# Patient Record
Sex: FEMALE | Race: WHITE | Employment: OTHER | URBAN - METROPOLITAN AREA
[De-identification: names, ages, dates, MRNs, and addresses within clinical notes are randomized per-mention and may not be internally consistent; named-entity substitution may affect disease eponyms.]

---

## 2018-06-29 ENCOUNTER — TELEPHONE (OUTPATIENT)
Dept: FAMILY MEDICINE CLINIC | Age: 83
End: 2018-06-29

## 2018-06-29 RX ORDER — SIMVASTATIN 20 MG
20 TABLET ORAL NIGHTLY
Qty: 30 TABLET | Refills: 5 | Status: ON HOLD | OUTPATIENT
Start: 2018-06-29 | End: 2018-07-23 | Stop reason: HOSPADM

## 2018-06-29 RX ORDER — LOSARTAN POTASSIUM 50 MG/1
50 TABLET ORAL DAILY
Qty: 30 TABLET | Refills: 5 | Status: SHIPPED | OUTPATIENT
Start: 2018-06-29

## 2018-07-16 ENCOUNTER — OFFICE VISIT (OUTPATIENT)
Dept: FAMILY MEDICINE CLINIC | Age: 83
End: 2018-07-16
Payer: MEDICAID

## 2018-07-16 ENCOUNTER — HOSPITAL ENCOUNTER (OUTPATIENT)
Age: 83
Discharge: HOME | DRG: 342 | End: 2018-07-18
Payer: MEDICAID

## 2018-07-16 VITALS
WEIGHT: 110 LBS | DIASTOLIC BLOOD PRESSURE: 62 MMHG | OXYGEN SATURATION: 96 % | BODY MASS INDEX: 21.6 KG/M2 | TEMPERATURE: 97.9 F | HEART RATE: 80 BPM | RESPIRATION RATE: 18 BRPM | SYSTOLIC BLOOD PRESSURE: 120 MMHG | HEIGHT: 60 IN

## 2018-07-16 DIAGNOSIS — I82.890 BLOOD CLOT OF NECK VEIN: ICD-10-CM

## 2018-07-16 DIAGNOSIS — C73 THYROID CANCER (HCC): ICD-10-CM

## 2018-07-16 DIAGNOSIS — E55.9 VITAMIN D DEFICIENCY: ICD-10-CM

## 2018-07-16 DIAGNOSIS — D72.819 LEUKOPENIA, UNSPECIFIED TYPE: ICD-10-CM

## 2018-07-16 DIAGNOSIS — I82.C11 THROMBOSIS OF RIGHT INTERNAL JUGULAR VEIN (HCC): ICD-10-CM

## 2018-07-16 DIAGNOSIS — M79.601 ARM PAIN, ANTERIOR, RIGHT: ICD-10-CM

## 2018-07-16 DIAGNOSIS — C73 THYROID CANCER (HCC): Primary | ICD-10-CM

## 2018-07-16 DIAGNOSIS — I10 ESSENTIAL HYPERTENSION, BENIGN: ICD-10-CM

## 2018-07-16 PROCEDURE — 85025 COMPLETE CBC W/AUTO DIFF WBC: CPT

## 2018-07-16 PROCEDURE — 82306 VITAMIN D 25 HYDROXY: CPT

## 2018-07-16 PROCEDURE — 36415 COLL VENOUS BLD VENIPUNCTURE: CPT | Performed by: FAMILY MEDICINE

## 2018-07-16 PROCEDURE — 99214 OFFICE O/P EST MOD 30 MIN: CPT | Performed by: FAMILY MEDICINE

## 2018-07-16 PROCEDURE — 80053 COMPREHEN METABOLIC PANEL: CPT

## 2018-07-16 PROCEDURE — G8400 PT W/DXA NO RESULTS DOC: HCPCS | Performed by: FAMILY MEDICINE

## 2018-07-16 PROCEDURE — G8427 DOCREV CUR MEDS BY ELIG CLIN: HCPCS | Performed by: FAMILY MEDICINE

## 2018-07-16 PROCEDURE — 4040F PNEUMOC VAC/ADMIN/RCVD: CPT | Performed by: FAMILY MEDICINE

## 2018-07-16 PROCEDURE — 1123F ACP DISCUSS/DSCN MKR DOCD: CPT | Performed by: FAMILY MEDICINE

## 2018-07-16 PROCEDURE — 84443 ASSAY THYROID STIM HORMONE: CPT

## 2018-07-16 PROCEDURE — 1090F PRES/ABSN URINE INCON ASSESS: CPT | Performed by: FAMILY MEDICINE

## 2018-07-16 PROCEDURE — 1036F TOBACCO NON-USER: CPT | Performed by: FAMILY MEDICINE

## 2018-07-16 PROCEDURE — G8420 CALC BMI NORM PARAMETERS: HCPCS | Performed by: FAMILY MEDICINE

## 2018-07-16 PROCEDURE — 1101F PT FALLS ASSESS-DOCD LE1/YR: CPT | Performed by: FAMILY MEDICINE

## 2018-07-16 RX ORDER — OXYCODONE HYDROCHLORIDE AND ACETAMINOPHEN 5; 325 MG/1; MG/1
1 TABLET ORAL 2 TIMES DAILY PRN
Qty: 60 TABLET | Refills: 0 | Status: ON HOLD | OUTPATIENT
Start: 2018-07-16 | End: 2018-07-23

## 2018-07-16 RX ORDER — LEVOTHYROXINE SODIUM 0.05 MG/1
50 TABLET ORAL
Status: ON HOLD | COMMUNITY
Start: 2017-10-26 | End: 2018-07-19

## 2018-07-16 ASSESSMENT — ENCOUNTER SYMPTOMS
VOMITING: 0
COUGH: 0
FACIAL SWELLING: 0
BACK PAIN: 0
SINUS PRESSURE: 0
SORE THROAT: 0
ALLERGIC/IMMUNOLOGIC NEGATIVE: 1
PHOTOPHOBIA: 0
APNEA: 0
SHORTNESS OF BREATH: 0
COLOR CHANGE: 0
WHEEZING: 0
DIARRHEA: 0
ABDOMINAL PAIN: 0
CHEST TIGHTNESS: 0
BLOOD IN STOOL: 0
NAUSEA: 0

## 2018-07-16 ASSESSMENT — PATIENT HEALTH QUESTIONNAIRE - PHQ9
1. LITTLE INTEREST OR PLEASURE IN DOING THINGS: 0
SUM OF ALL RESPONSES TO PHQ9 QUESTIONS 1 & 2: 0
SUM OF ALL RESPONSES TO PHQ QUESTIONS 1-9: 0
2. FEELING DOWN, DEPRESSED OR HOPELESS: 0

## 2018-07-16 NOTE — PROGRESS NOTES
Chief Complaint:   Chief Complaint   Patient presents with    Otalgia    Fatigue    Arm Pain     rigth upper arm       Fatigue   This is a recurrent problem. The current episode started more than 1 year ago. Associated symptoms include arthralgias and fatigue. Pertinent negatives include no abdominal pain, chest pain, congestion, coughing, headaches, joint swelling, myalgias, nausea, rash, sore throat, vomiting or weakness. Arm Pain    The incident occurred more than 1 week ago. There was no injury mechanism. The pain is present in the upper right arm. The quality of the pain is described as aching. The pain is at a severity of 7/10. The pain is moderate. The pain has been fluctuating since the incident. Pertinent negatives include no chest pain.        Patient Active Problem List   Diagnosis    Rectal bleeding    Familial combined hyperlipidemia    Essential hypertension, benign    Blood clot of neck vein    Thyroid cancer ([address]    Internal jugular vein thrombosis (HCC)    Pericardial cyst    Vitamin D deficiency       Past Medical History:   Diagnosis Date    Cancer ([address]     thyroid    Diabetes mellitus type 2, diet-controlled ([address]     GERD (gastroesophageal reflux disease)     Hyperlipidemia     Hypertension     Rectal bleed     Thyroid cancer ([address] 2016    Wears dentures     upper       Past Surgical History:   Procedure Laterality Date    APPENDECTOMY      BLADDER SUSPENSION  2008     SECTION      CHOLECYSTECTOMY      CHOLECYSTECTOMY  2003    COLONOSCOPY  11/10/2014    So - sigmoid diverticulosis     CO SONO GUIDE NEEDLE BIOPSY Right     Thyroid    UPPER GASTROINTESTINAL ENDOSCOPY  11/10/2014    So - Hiatal hernia        Current Outpatient Prescriptions   Medication Sig Dispense Refill    levothyroxine (SYNTHROID) 50 MCG tablet Take 50 mcg by mouth      oxyCODONE-acetaminophen (PERCOCET) 5-325 MG per tablet Take 1 tablet by mouth 2 times daily as needed for Pain for up to 30 days. . 60 tablet 0    apixaban (ELIQUIS) 5 MG TABS tablet Take 1 tablet by mouth 2 times daily 60 tablet 5    simvastatin (ZOCOR) 20 MG tablet Take 1 tablet by mouth nightly 30 tablet 5    losartan (COZAAR) 50 MG tablet Take 1 tablet by mouth daily 30 tablet 5    omeprazole (PRILOSEC) 20 MG delayed release capsule Take 1 capsule by mouth daily 30 capsule 5     No current facility-administered medications for this visit. Allergies   Allergen Reactions    Pcn [Penicillins] Rash       Social History     Social History    Marital status:      Spouse name: N/A    Number of children: N/A    Years of education: N/A     Occupational History     None     Social History Main Topics    Smoking status: Never Smoker    Smokeless tobacco: Never Used    Alcohol use No      Comment: coffee 2 cups daily    Drug use: No    Sexual activity: Not Asked     Other Topics Concern    None     Social History Narrative    None       Family History   Problem Relation Age of Onset    Cancer Mother         vaginal    Cancer Father         prostate         Review of Systems   Constitutional: Positive for fatigue. HENT: Negative for congestion, facial swelling, hearing loss, nosebleeds, sinus pressure and sore throat. Eyes: Negative for photophobia and visual disturbance. Respiratory: Negative for apnea, cough, chest tightness, shortness of breath and wheezing. Cardiovascular: Negative for chest pain, palpitations and leg swelling. Gastrointestinal: Negative for abdominal pain, blood in stool, diarrhea, nausea and vomiting. Genitourinary: Negative for difficulty urinating, frequency and urgency. Musculoskeletal: Positive for arthralgias. Negative for back pain, joint swelling and myalgias. Skin: Negative for color change and rash. Allergic/Immunologic: Negative. Neurological: Negative for syncope, weakness, light-headedness and headaches. Hematological: Negative. Psychiatric/Behavioral: Negative for agitation, behavioral problems, confusion and self-injury. The patient is not nervous/anxious. All other systems reviewed and are negative. Physical Exam   Constitutional: She is oriented to person, place, and time. She appears well-developed and well-nourished. No distress. HENT:   Head: Normocephalic and atraumatic. Nose: Nose normal.   Mouth/Throat: Oropharynx is clear and moist.   Eyes: Conjunctivae and EOM are normal. Pupils are equal, round, and reactive to light. Neck: Normal range of motion. Neck supple. No JVD present. No thyromegaly present. Cardiovascular: Normal rate, regular rhythm and normal heart sounds. Exam reveals no gallop and no friction rub. No murmur heard. Pulmonary/Chest: Effort normal and breath sounds normal. No respiratory distress. She has no wheezes. Abdominal: Soft. Bowel sounds are normal. She exhibits no distension. There is no tenderness. There is no rebound and no guarding. Musculoskeletal: Normal range of motion. Right upper arm: She exhibits tenderness, bony tenderness and swelling. Lymphadenopathy:     She has no cervical adenopathy. Neurological: She is alert and oriented to person, place, and time. She has normal reflexes. No cranial nerve deficit. She exhibits normal muscle tone. Coordination normal.   Skin: Skin is warm and dry. No rash noted. No erythema. Psychiatric: She has a normal mood and affect. Her behavior is normal. Judgment normal.   Nursing note and vitals reviewed. ASSESSMENT/PLAN:    Maura Chandler was seen today for otalgia, fatigue and arm pain. Diagnoses and all orders for this visit:    Th[facility] cancer ([facility]  -     CBC Auto Differential; Future  -     Comprehensive Metabolic Panel; Future  -     TSH without Reflex; Future  -     oxyCODONE-acetaminophen (PERCOCET) 5-325 MG per tablet;  Take 1 tablet by mouth 2 times daily as needed for Pain for up to 30 days..    Blood clot of neck vein  -     TSH without Reflex; Future  -     oxyCODONE-acetaminophen (PERCOCET) 5-325 MG per tablet; Take 1 tablet by mouth 2 times daily as needed for Pain for up to 30 days. .    Essential hypertension, benign    Arm pain, anterior, right  -     oxyCODONE-acetaminophen (PERCOCET) 5-325 MG per tablet; Take 1 tablet by mouth 2 times daily as needed for Pain for up to 30 days. .    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

## 2018-07-17 DIAGNOSIS — D70.9 NEUTROPENIA, UNSPECIFIED TYPE (HCC): Primary | ICD-10-CM

## 2018-07-17 LAB
ALBUMIN SERPL-MCNC: 4.2 G/DL (ref 3.5–5.2)
ALP BLD-CCNC: 67 U/L (ref 35–104)
ALT SERPL-CCNC: 7 U/L (ref 0–32)
ANION GAP SERPL CALCULATED.3IONS-SCNC: 11 MMOL/L (ref 7–16)
AST SERPL-CCNC: 22 U/L (ref 0–31)
BASOPHILS ABSOLUTE: 0 E9/L (ref 0–0.2)
BASOPHILS RELATIVE PERCENT: 0 % (ref 0–2)
BILIRUB SERPL-MCNC: 0.5 MG/DL (ref 0–1.2)
BUN BLDV-MCNC: 11 MG/DL (ref 8–23)
CALCIUM SERPL-MCNC: 10 MG/DL (ref 8.6–10.2)
CHLORIDE BLD-SCNC: 102 MMOL/L (ref 98–107)
CO2: 30 MMOL/L (ref 22–29)
CREAT SERPL-MCNC: 0.5 MG/DL (ref 0.5–1)
EOSINOPHILS ABSOLUTE: 0.08 E9/L (ref 0.05–0.5)
EOSINOPHILS RELATIVE PERCENT: 3.7 % (ref 0–6)
GFR AFRICAN AMERICAN: >60
GFR NON-AFRICAN AMERICAN: >60 ML/MIN/1.73
GLUCOSE BLD-MCNC: 152 MG/DL (ref 74–109)
HCT VFR BLD CALC: 41.1 % (ref 34–48)
HEMOGLOBIN: 13.1 G/DL (ref 11.5–15.5)
IMMATURE GRANULOCYTES #: 0.01 E9/L
IMMATURE GRANULOCYTES %: 0.5 % (ref 0–5)
LYMPHOCYTES ABSOLUTE: 0.65 E9/L (ref 1.5–4)
LYMPHOCYTES RELATIVE PERCENT: 29.7 % (ref 20–42)
MCH RBC QN AUTO: 30.1 PG (ref 26–35)
MCHC RBC AUTO-ENTMCNC: 31.9 % (ref 32–34.5)
MCV RBC AUTO: 94.5 FL (ref 80–99.9)
MONOCYTES ABSOLUTE: 0.4 E9/L (ref 0.1–0.95)
MONOCYTES RELATIVE PERCENT: 18.3 % (ref 2–12)
NEUTROPHILS ABSOLUTE: 1.05 E9/L (ref 1.8–7.3)
NEUTROPHILS RELATIVE PERCENT: 47.8 % (ref 43–80)
PDW BLD-RTO: 12.3 FL (ref 11.5–15)
PLATELET # BLD: 190 E9/L (ref 130–450)
PMV BLD AUTO: 10.3 FL (ref 7–12)
POTASSIUM SERPL-SCNC: 5.2 MMOL/L (ref 3.5–5)
RBC # BLD: 4.35 E12/L (ref 3.5–5.5)
SODIUM BLD-SCNC: 143 MMOL/L (ref 132–146)
TOTAL PROTEIN: 7 G/DL (ref 6.4–8.3)
TSH SERPL DL<=0.05 MIU/L-ACNC: 0.01 UIU/ML (ref 0.27–4.2)
VITAMIN D 25-HYDROXY: 30 NG/ML (ref 30–100)
WBC # BLD: 2.2 E9/L (ref 4.5–11.5)

## 2018-07-19 ENCOUNTER — APPOINTMENT (OUTPATIENT)
Dept: GENERAL RADIOLOGY | Age: 83
End: 2018-07-19
Payer: MEDICAID

## 2018-07-19 ENCOUNTER — APPOINTMENT (OUTPATIENT)
Dept: CT IMAGING | Age: 83
End: 2018-07-19
Payer: MEDICAID

## 2018-07-19 ENCOUNTER — HOSPITAL ENCOUNTER (EMERGENCY)
Age: 83
Discharge: ANOTHER HOSPITAL | End: 2018-07-19
Attending: EMERGENCY MEDICINE
Payer: MEDICAID

## 2018-07-19 ENCOUNTER — HOSPITAL ENCOUNTER (OUTPATIENT)
Age: 83
Discharge: HOME | End: 2018-07-19
Payer: MEDICAID

## 2018-07-19 ENCOUNTER — HOSPITAL ENCOUNTER (INPATIENT)
Age: 83
LOS: 4 days | Discharge: HOME | DRG: 342 | End: 2018-07-23
Attending: INTERNAL MEDICINE | Admitting: INTERNAL MEDICINE
Payer: MEDICAID

## 2018-07-19 VITALS
OXYGEN SATURATION: 96 % | HEART RATE: 100 BPM | TEMPERATURE: 97.9 F | RESPIRATION RATE: 14 BRPM | BODY MASS INDEX: 21.6 KG/M2 | DIASTOLIC BLOOD PRESSURE: 44 MMHG | SYSTOLIC BLOOD PRESSURE: 114 MMHG | WEIGHT: 110 LBS | HEIGHT: 60 IN

## 2018-07-19 DIAGNOSIS — C79.9 METASTATIC CANCER (HCC): ICD-10-CM

## 2018-07-19 DIAGNOSIS — I82.890 BLOOD CLOT OF NECK VEIN: ICD-10-CM

## 2018-07-19 DIAGNOSIS — C73 THYROID CANCER (HCC): ICD-10-CM

## 2018-07-19 DIAGNOSIS — S42.291A HUMERAL HEAD FRACTURE, RIGHT, CLOSED, INITIAL ENCOUNTER: Primary | ICD-10-CM

## 2018-07-19 DIAGNOSIS — M79.601 ARM PAIN, ANTERIOR, RIGHT: ICD-10-CM

## 2018-07-19 PROBLEM — I10 HTN (HYPERTENSION): Chronic | Status: ACTIVE | Noted: 2018-07-19

## 2018-07-19 PROBLEM — S42.309A HUMERUS FRACTURE: Status: ACTIVE | Noted: 2018-07-19

## 2018-07-19 PROBLEM — Z79.01 CHRONIC ANTICOAGULATION: Chronic | Status: ACTIVE | Noted: 2018-07-19

## 2018-07-19 PROBLEM — E78.5 HYPERLIPIDEMIA: Chronic | Status: ACTIVE | Noted: 2018-07-19

## 2018-07-19 LAB
ALBUMIN SERPL-MCNC: 4 G/DL (ref 3.5–5.2)
ALP BLD-CCNC: 66 U/L (ref 35–104)
ALT SERPL-CCNC: 8 U/L (ref 0–32)
ANION GAP SERPL CALCULATED.3IONS-SCNC: 13 MMOL/L (ref 7–16)
APTT: 31 SEC (ref 24.5–35.1)
AST SERPL-CCNC: 21 U/L (ref 0–31)
BASOPHILS ABSOLUTE: 0 E9/L (ref 0–0.2)
BASOPHILS RELATIVE PERCENT: 0 % (ref 0–2)
BILIRUB SERPL-MCNC: 0.6 MG/DL (ref 0–1.2)
BUN BLDV-MCNC: 14 MG/DL (ref 8–23)
CALCIUM SERPL-MCNC: 9.9 MG/DL (ref 8.6–10.2)
CHLORIDE BLD-SCNC: 101 MMOL/L (ref 98–107)
CO2: 25 MMOL/L (ref 22–29)
CREAT SERPL-MCNC: 0.5 MG/DL (ref 0.5–1)
EKG ATRIAL RATE: 93 BPM
EKG P AXIS: 72 DEGREES
EKG P-R INTERVAL: 138 MS
EKG Q-T INTERVAL: 370 MS
EKG QRS DURATION: 76 MS
EKG QTC CALCULATION (BAZETT): 460 MS
EKG R AXIS: 45 DEGREES
EKG T AXIS: 47 DEGREES
EKG VENTRICULAR RATE: 93 BPM
EOSINOPHILS ABSOLUTE: 0.04 E9/L (ref 0.05–0.5)
EOSINOPHILS RELATIVE PERCENT: 0.9 % (ref 0–6)
GFR AFRICAN AMERICAN: >60
GFR NON-AFRICAN AMERICAN: >60 ML/MIN/1.73
GLUCOSE BLD-MCNC: 150 MG/DL (ref 74–109)
HCT VFR BLD CALC: 38.2 % (ref 34–48)
HEMOGLOBIN: 12.4 G/DL (ref 11.5–15.5)
IMMATURE GRANULOCYTES #: 0.01 E9/L
IMMATURE GRANULOCYTES %: 0.2 % (ref 0–5)
INR BLD: 1.3
LACTIC ACID: 1 MMOL/L (ref 0.5–2.2)
LIPASE: 20 U/L (ref 13–60)
LYMPHOCYTES ABSOLUTE: 0.45 E9/L (ref 1.5–4)
LYMPHOCYTES RELATIVE PERCENT: 10.4 % (ref 20–42)
MCH RBC QN AUTO: 29.3 PG (ref 26–35)
MCHC RBC AUTO-ENTMCNC: 32.5 % (ref 32–34.5)
MCV RBC AUTO: 90.3 FL (ref 80–99.9)
MONOCYTES ABSOLUTE: 0.33 E9/L (ref 0.1–0.95)
MONOCYTES RELATIVE PERCENT: 7.6 % (ref 2–12)
NEUTROPHILS ABSOLUTE: 3.51 E9/L (ref 1.8–7.3)
NEUTROPHILS RELATIVE PERCENT: 80.9 % (ref 43–80)
PDW BLD-RTO: 12.2 FL (ref 11.5–15)
PLATELET # BLD: 179 E9/L (ref 130–450)
PMV BLD AUTO: 10.1 FL (ref 7–12)
POTASSIUM SERPL-SCNC: 4.3 MMOL/L (ref 3.5–5)
PROTHROMBIN TIME: 14.5 SEC (ref 9.3–12.4)
RBC # BLD: 4.23 E12/L (ref 3.5–5.5)
RBC # BLD: NORMAL 10*6/UL
SEDIMENTATION RATE, ERYTHROCYTE: 12 MM/HR (ref 0–20)
SODIUM BLD-SCNC: 139 MMOL/L (ref 132–146)
TOTAL PROTEIN: 6.8 G/DL (ref 6.4–8.3)
WBC # BLD: 4.3 E9/L (ref 4.5–11.5)

## 2018-07-19 PROCEDURE — 6360000004 HC RX CONTRAST MEDICATION: Performed by: RADIOLOGY

## 2018-07-19 PROCEDURE — 6370000000 HC RX 637 (ALT 250 FOR IP): Performed by: INTERNAL MEDICINE

## 2018-07-19 PROCEDURE — 96374 THER/PROPH/DIAG INJ IV PUSH: CPT

## 2018-07-19 PROCEDURE — 72132 CT LUMBAR SPINE W/DYE: CPT

## 2018-07-19 PROCEDURE — 70450 CT HEAD/BRAIN W/O DYE: CPT

## 2018-07-19 PROCEDURE — 6360000002 HC RX W HCPCS: Performed by: EMERGENCY MEDICINE

## 2018-07-19 PROCEDURE — 99254 IP/OBS CNSLTJ NEW/EST MOD 60: CPT | Performed by: ORTHOPAEDIC SURGERY

## 2018-07-19 PROCEDURE — 72126 CT NECK SPINE W/DYE: CPT

## 2018-07-19 PROCEDURE — 36415 COLL VENOUS BLD VENIPUNCTURE: CPT

## 2018-07-19 PROCEDURE — 6360000002 HC RX W HCPCS: Performed by: INTERNAL MEDICINE

## 2018-07-19 PROCEDURE — A0425 GROUND MILEAGE: HCPCS

## 2018-07-19 PROCEDURE — 83690 ASSAY OF LIPASE: CPT

## 2018-07-19 PROCEDURE — 85651 RBC SED RATE NONAUTOMATED: CPT

## 2018-07-19 PROCEDURE — 85610 PROTHROMBIN TIME: CPT

## 2018-07-19 PROCEDURE — A0428 BLS: HCPCS

## 2018-07-19 PROCEDURE — 80053 COMPREHEN METABOLIC PANEL: CPT

## 2018-07-19 PROCEDURE — 87040 BLOOD CULTURE FOR BACTERIA: CPT

## 2018-07-19 PROCEDURE — 96375 TX/PRO/DX INJ NEW DRUG ADDON: CPT

## 2018-07-19 PROCEDURE — 2580000003 HC RX 258: Performed by: INTERNAL MEDICINE

## 2018-07-19 PROCEDURE — 73060 X-RAY EXAM OF HUMERUS: CPT

## 2018-07-19 PROCEDURE — 85730 THROMBOPLASTIN TIME PARTIAL: CPT

## 2018-07-19 PROCEDURE — 1200000000 HC SEMI PRIVATE

## 2018-07-19 PROCEDURE — 85025 COMPLETE CBC W/AUTO DIFF WBC: CPT

## 2018-07-19 PROCEDURE — 83605 ASSAY OF LACTIC ACID: CPT

## 2018-07-19 PROCEDURE — 99285 EMERGENCY DEPT VISIT HI MDM: CPT

## 2018-07-19 PROCEDURE — 2580000003 HC RX 258: Performed by: EMERGENCY MEDICINE

## 2018-07-19 PROCEDURE — 6360000002 HC RX W HCPCS: Performed by: STUDENT IN AN ORGANIZED HEALTH CARE EDUCATION/TRAINING PROGRAM

## 2018-07-19 RX ORDER — SODIUM CHLORIDE 9 MG/ML
INJECTION, SOLUTION INTRAVENOUS CONTINUOUS
Status: DISCONTINUED | OUTPATIENT
Start: 2018-07-19 | End: 2018-07-23

## 2018-07-19 RX ORDER — ACETAMINOPHEN 325 MG/1
650 TABLET ORAL EVERY 4 HOURS PRN
Status: DISCONTINUED | OUTPATIENT
Start: 2018-07-19 | End: 2018-07-23 | Stop reason: HOSPADM

## 2018-07-19 RX ORDER — SODIUM CHLORIDE 0.9 % (FLUSH) 0.9 %
10 SYRINGE (ML) INJECTION EVERY 12 HOURS SCHEDULED
Status: CANCELLED | OUTPATIENT
Start: 2018-07-19

## 2018-07-19 RX ORDER — LEVOTHYROXINE SODIUM 0.05 MG/1
50 TABLET ORAL DAILY
Status: DISCONTINUED | OUTPATIENT
Start: 2018-07-20 | End: 2018-07-20

## 2018-07-19 RX ORDER — SODIUM CHLORIDE 0.9 % (FLUSH) 0.9 %
10 SYRINGE (ML) INJECTION PRN
Status: DISCONTINUED | OUTPATIENT
Start: 2018-07-19 | End: 2018-07-23 | Stop reason: HOSPADM

## 2018-07-19 RX ORDER — ONDANSETRON 2 MG/ML
4 INJECTION INTRAMUSCULAR; INTRAVENOUS ONCE
Status: COMPLETED | OUTPATIENT
Start: 2018-07-19 | End: 2018-07-19

## 2018-07-19 RX ORDER — MORPHINE SULFATE 4 MG/ML
4 INJECTION, SOLUTION INTRAMUSCULAR; INTRAVENOUS ONCE
Status: COMPLETED | OUTPATIENT
Start: 2018-07-19 | End: 2018-07-19

## 2018-07-19 RX ORDER — FENTANYL CITRATE 50 UG/ML
50 INJECTION, SOLUTION INTRAMUSCULAR; INTRAVENOUS ONCE
Status: COMPLETED | OUTPATIENT
Start: 2018-07-19 | End: 2018-07-19

## 2018-07-19 RX ORDER — FENTANYL CITRATE 50 UG/ML
50 INJECTION, SOLUTION INTRAMUSCULAR; INTRAVENOUS
Status: DISCONTINUED | OUTPATIENT
Start: 2018-07-19 | End: 2018-07-19 | Stop reason: HOSPADM

## 2018-07-19 RX ORDER — SIMVASTATIN 20 MG
20 TABLET ORAL NIGHTLY
Status: DISCONTINUED | OUTPATIENT
Start: 2018-07-19 | End: 2018-07-23 | Stop reason: HOSPADM

## 2018-07-19 RX ORDER — ONDANSETRON 2 MG/ML
4 INJECTION INTRAMUSCULAR; INTRAVENOUS EVERY 6 HOURS PRN
Status: DISCONTINUED | OUTPATIENT
Start: 2018-07-19 | End: 2018-07-23 | Stop reason: HOSPADM

## 2018-07-19 RX ORDER — MORPHINE SULFATE 2 MG/ML
2 INJECTION, SOLUTION INTRAMUSCULAR; INTRAVENOUS EVERY 4 HOURS PRN
Status: DISCONTINUED | OUTPATIENT
Start: 2018-07-19 | End: 2018-07-20

## 2018-07-19 RX ORDER — SODIUM CHLORIDE 0.9 % (FLUSH) 0.9 %
10 SYRINGE (ML) INJECTION EVERY 12 HOURS SCHEDULED
Status: DISCONTINUED | OUTPATIENT
Start: 2018-07-19 | End: 2018-07-23 | Stop reason: HOSPADM

## 2018-07-19 RX ORDER — LOSARTAN POTASSIUM 50 MG/1
50 TABLET ORAL DAILY
Status: DISCONTINUED | OUTPATIENT
Start: 2018-07-19 | End: 2018-07-20

## 2018-07-19 RX ORDER — SODIUM CHLORIDE 9 MG/ML
INJECTION, SOLUTION INTRAVENOUS CONTINUOUS
Status: CANCELLED | OUTPATIENT
Start: 2018-07-19

## 2018-07-19 RX ORDER — OXYCODONE HYDROCHLORIDE AND ACETAMINOPHEN 5; 325 MG/1; MG/1
1 TABLET ORAL EVERY 4 HOURS PRN
Status: DISCONTINUED | OUTPATIENT
Start: 2018-07-19 | End: 2018-07-20

## 2018-07-19 RX ORDER — ACETAMINOPHEN 325 MG/1
650 TABLET ORAL EVERY 4 HOURS PRN
Status: CANCELLED | OUTPATIENT
Start: 2018-07-19

## 2018-07-19 RX ORDER — SIMVASTATIN 20 MG
20 TABLET ORAL NIGHTLY
Status: CANCELLED | OUTPATIENT
Start: 2018-07-19

## 2018-07-19 RX ORDER — ONDANSETRON 2 MG/ML
4 INJECTION INTRAMUSCULAR; INTRAVENOUS EVERY 6 HOURS PRN
Status: CANCELLED | OUTPATIENT
Start: 2018-07-19

## 2018-07-19 RX ORDER — OXYCODONE HYDROCHLORIDE AND ACETAMINOPHEN 5; 325 MG/1; MG/1
1 TABLET ORAL EVERY 4 HOURS PRN
Status: CANCELLED | OUTPATIENT
Start: 2018-07-19

## 2018-07-19 RX ORDER — SODIUM CHLORIDE 0.9 % (FLUSH) 0.9 %
10 SYRINGE (ML) INJECTION PRN
Status: CANCELLED | OUTPATIENT
Start: 2018-07-19

## 2018-07-19 RX ORDER — MORPHINE SULFATE 2 MG/ML
2 INJECTION, SOLUTION INTRAMUSCULAR; INTRAVENOUS EVERY 4 HOURS PRN
Status: CANCELLED | OUTPATIENT
Start: 2018-07-19

## 2018-07-19 RX ORDER — SODIUM CHLORIDE 9 MG/ML
INJECTION, SOLUTION INTRAVENOUS CONTINUOUS
Status: DISCONTINUED | OUTPATIENT
Start: 2018-07-19 | End: 2018-07-19 | Stop reason: HOSPADM

## 2018-07-19 RX ORDER — LOSARTAN POTASSIUM 50 MG/1
50 TABLET ORAL DAILY
Status: CANCELLED | OUTPATIENT
Start: 2018-07-19

## 2018-07-19 RX ORDER — LEVOTHYROXINE SODIUM 0.05 MG/1
50 TABLET ORAL DAILY
Status: CANCELLED | OUTPATIENT
Start: 2018-07-20

## 2018-07-19 RX ADMIN — ONDANSETRON HYDROCHLORIDE 4 MG: 2 INJECTION, SOLUTION INTRAMUSCULAR; INTRAVENOUS at 12:45

## 2018-07-19 RX ADMIN — MORPHINE SULFATE 2 MG: 2 INJECTION, SOLUTION INTRAMUSCULAR; INTRAVENOUS at 20:53

## 2018-07-19 RX ADMIN — LOSARTAN POTASSIUM 50 MG: 50 TABLET, FILM COATED ORAL at 21:03

## 2018-07-19 RX ADMIN — SODIUM CHLORIDE: 9 INJECTION, SOLUTION INTRAVENOUS at 21:03

## 2018-07-19 RX ADMIN — FENTANYL CITRATE 50 MCG: 50 INJECTION, SOLUTION INTRAMUSCULAR; INTRAVENOUS at 21:54

## 2018-07-19 RX ADMIN — MORPHINE SULFATE 4 MG: 4 INJECTION, SOLUTION INTRAMUSCULAR; INTRAVENOUS at 12:45

## 2018-07-19 RX ADMIN — SODIUM CHLORIDE: 9 INJECTION, SOLUTION INTRAVENOUS at 12:45

## 2018-07-19 RX ADMIN — IOPAMIDOL 150 ML: 755 INJECTION, SOLUTION INTRAVENOUS at 02:43

## 2018-07-19 RX ADMIN — FENTANYL CITRATE 50 MCG: 50 INJECTION, SOLUTION INTRAMUSCULAR; INTRAVENOUS at 18:24

## 2018-07-19 RX ADMIN — Medication 10 ML: at 21:03

## 2018-07-19 RX ADMIN — SIMVASTATIN 20 MG: 20 TABLET, FILM COATED ORAL at 21:03

## 2018-07-19 ASSESSMENT — PAIN DESCRIPTION - PROGRESSION
CLINICAL_PROGRESSION: NOT CHANGED

## 2018-07-19 ASSESSMENT — PAIN DESCRIPTION - ONSET
ONSET: ON-GOING

## 2018-07-19 ASSESSMENT — PAIN DESCRIPTION - LOCATION
LOCATION: ARM

## 2018-07-19 ASSESSMENT — PAIN DESCRIPTION - FREQUENCY
FREQUENCY: CONTINUOUS

## 2018-07-19 ASSESSMENT — PAIN DESCRIPTION - DESCRIPTORS
DESCRIPTORS: ACHING;DISCOMFORT;CONSTANT
DESCRIPTORS: DISCOMFORT;CONSTANT
DESCRIPTORS: ACHING;DISCOMFORT;CONSTANT

## 2018-07-19 ASSESSMENT — PAIN SCALES - GENERAL
PAINLEVEL_OUTOF10: 8
PAINLEVEL_OUTOF10: 8
PAINLEVEL_OUTOF10: 7
PAINLEVEL_OUTOF10: 8
PAINLEVEL_OUTOF10: 6
PAINLEVEL_OUTOF10: 6

## 2018-07-19 ASSESSMENT — PAIN DESCRIPTION - ORIENTATION
ORIENTATION: RIGHT

## 2018-07-19 NOTE — ED NOTES
Bed: 12  Expected date:   Expected time:   Means of arrival:   Comments:  EMS     Teodora Mcneill RN  07/19/18 1457

## 2018-07-19 NOTE — ED NOTES
Nurse report called to [facility] at [phone].      Felicity Paige RN  07/19/18 8423

## 2018-07-19 NOTE — ED PROVIDER NOTES
Axis 47 degrees       RADIOLOGY:  Interpreted by Radiologist.  Melva Hurt   Final Result   1. Widespread metastatic disease to the bone structures with large   expansile lytic lesions seen in the lower thoracic spine, sacral spine   and right iliac bone. 2. Presence of hypervascular enhancing expansile lesions throughout   the liver compatible with liver metastatic disease. 3. No conspicuous acute displaced fractures identified in the lumbar   spine. ALERT:  THIS IS AN ABNORMAL REPORT      CT CERVICAL SPINE W CONTRAST   Final Result   There is a large skull base mass along the clivus and cavernous sinus   sinuses, which completely envelops the left internal carotid artery   below the bifurcation. There is complete collapse of the C7 vertebrae without neural   compromise, but with resultant ventral flexion of the cervical spine   at that level. There is sclerosis and irregularity of the medial first rib on the   right, perhaps pathologic fracture healing. There is no evidence of acute traumatic hemorrhage or cervical spinal   change. CT HEAD WO CONTRAST   Final Result   Addendum 1 of 1   Addendum: Postcontrast imaging of the cervical spine was performed. The left central skull base mass measuring 3.8 x 3.8 cm in size   encases the petrous segment and cavernous segment left ICA. Final   1. No acute intracranial hemorrhage, midline shift, or mass effect. 2. Lytic metastatic disease largest involving the central skull base   extending to the region of the cavernous and petrous segment ICA. CTA   could be beneficial to evaluate the left ICA   3. Parafalcine mass representing parafalcine meningioma versus   metastases. XR HUMERUS RIGHT (MIN 2 VIEWS)   Final Result   There is a nondisplaced or incomplete oblique fracture through the mid   humeral shaft, without proximal or distal articular involvement or   other significant acute findings.  MRI and noncontrast CT pending. Patient was stable, no change. Time 3:27 PM  Patient is stable. Discussed results with family. Upon re-examination, patient is resting, NAD. Patients symptoms show no change. The emergency provider has shared the specific reasons for admission and transfer, and has shared results of today's workup. The family is agreeable for admission and transfer. Consultations:             Time: 2:42 PM.   Spoke with Dr. Karina Ge (Orthopedics). Discussed case. They will provide consultation. Time: 4:02 PM.   Spoke with Dr. Uli Weir (Medicine). Discussed case. They will provide consultation and recommend neurosurgery consult. Consultation:  I Spoke with Dr. Leonora Sultana (Neurosurgery). Discussed case. They will provide consultation. Counseling: The emergency provider has spoken with the patient's family and discussed todays results, in addition to providing specific details for the plan of care and counseling regarding the diagnosis and prognosis. Questions are answered at this time and they are agreeable with the plan.       --------------------------------- IMPRESSION AND DISPOSITION ---------------------------------    IMPRESSION  1. Humeral head fracture, right, closed, initial encounter    2. Metastatic cancer ([facility]        DISPOSITION  Disposition: Transfer to [place] [address] to be admitted  Patient condition is stable    SCRIBE ATTESTATION  7/19/18, 12:00 PM.    Portions of this note is prepared by Warren Sinclair acting as Scribe for Jose Daugherty MD.    Jose Daugherty MD:  The scribe's documentation has been prepared under my direction and personally reviewed by me in its entirety. I confirm that the note above accurately reflects all work, treatment, procedures, and medical decision making performed by me.              Jose Daugherty MD  07/19/18 5874

## 2018-07-19 NOTE — ED NOTES
Assumed care of pt. No s/s of distress. Resps easy. A&O. Cardiac/hemodynamic monitoring in place. Pt placed in hospital gown and socks. Personal belongings given to family. Family in room. Radial pulses noted bilat. Witness Ketamine waste with EMS.  Attending notified pt received Ketamine pre hospital.     Colin Holm RN  07/19/18 9518

## 2018-07-19 NOTE — ED NOTES
SINAN in room. Report and paperwork given to crew. No s/s of distress. Resps easy. A&O. Visitors in room.      Oral Garduno RN  07/19/18 6692

## 2018-07-20 ENCOUNTER — APPOINTMENT (OUTPATIENT)
Dept: GENERAL RADIOLOGY | Age: 83
DRG: 342 | End: 2018-07-20
Attending: INTERNAL MEDICINE
Payer: MEDICAID

## 2018-07-20 ENCOUNTER — HOSPITAL ENCOUNTER (OUTPATIENT)
Dept: RADIATION ONCOLOGY | Age: 83
Discharge: HOME | End: 2018-07-20
Payer: MEDICAID

## 2018-07-20 PROBLEM — C79.51 MALIGNANT NEOPLASM METASTATIC TO BONE OF SKULL WITH UNKNOWN PRIMARY SITE (HCC): Status: ACTIVE | Noted: 2018-07-20

## 2018-07-20 PROBLEM — C80.1 MALIGNANT NEOPLASM METASTATIC TO BONE OF SKULL WITH UNKNOWN PRIMARY SITE (HCC): Status: ACTIVE | Noted: 2018-07-20

## 2018-07-20 PROCEDURE — 2580000003 HC RX 258: Performed by: INTERNAL MEDICINE

## 2018-07-20 PROCEDURE — 99223 1ST HOSP IP/OBS HIGH 75: CPT | Performed by: RADIOLOGY

## 2018-07-20 PROCEDURE — 73502 X-RAY EXAM HIP UNI 2-3 VIEWS: CPT

## 2018-07-20 PROCEDURE — 77075 RADEX OSSEOUS SURVEY COMPL: CPT

## 2018-07-20 PROCEDURE — 77307 TELETHX ISODOSE PLAN CPLX: CPT

## 2018-07-20 PROCEDURE — 77412 RADIATION TX DELIVERY LVL 3: CPT

## 2018-07-20 PROCEDURE — 6370000000 HC RX 637 (ALT 250 FOR IP): Performed by: PHYSICIAN ASSISTANT

## 2018-07-20 PROCEDURE — 73060 X-RAY EXAM OF HUMERUS: CPT

## 2018-07-20 PROCEDURE — 77290 THER RAD SIMULAJ FIELD CPLX: CPT

## 2018-07-20 PROCEDURE — 6360000002 HC RX W HCPCS: Performed by: INTERNAL MEDICINE

## 2018-07-20 PROCEDURE — 1200000000 HC SEMI PRIVATE

## 2018-07-20 PROCEDURE — 73552 X-RAY EXAM OF FEMUR 2/>: CPT

## 2018-07-20 PROCEDURE — 99222 1ST HOSP IP/OBS MODERATE 55: CPT | Performed by: NEUROLOGICAL SURGERY

## 2018-07-20 PROCEDURE — 77334 RADIATION TREATMENT AID(S): CPT

## 2018-07-20 PROCEDURE — 77417 THER RADIOLOGY PORT IMAGE(S): CPT

## 2018-07-20 PROCEDURE — 6370000000 HC RX 637 (ALT 250 FOR IP): Performed by: INTERNAL MEDICINE

## 2018-07-20 PROCEDURE — 99222 1ST HOSP IP/OBS MODERATE 55: CPT | Performed by: PHYSICIAN ASSISTANT

## 2018-07-20 RX ORDER — OXYCODONE HYDROCHLORIDE 10 MG/1
10 TABLET ORAL EVERY 4 HOURS PRN
Status: DISCONTINUED | OUTPATIENT
Start: 2018-07-20 | End: 2018-07-23 | Stop reason: HOSPADM

## 2018-07-20 RX ORDER — SENNA AND DOCUSATE SODIUM 50; 8.6 MG/1; MG/1
1 TABLET, FILM COATED ORAL 2 TIMES DAILY
Status: DISCONTINUED | OUTPATIENT
Start: 2018-07-20 | End: 2018-07-23 | Stop reason: HOSPADM

## 2018-07-20 RX ORDER — OXYCODONE HYDROCHLORIDE 10 MG/1
10 TABLET ORAL 3 TIMES DAILY
Status: DISCONTINUED | OUTPATIENT
Start: 2018-07-20 | End: 2018-07-23 | Stop reason: HOSPADM

## 2018-07-20 RX ORDER — MORPHINE SULFATE 4 MG/ML
4 INJECTION, SOLUTION INTRAMUSCULAR; INTRAVENOUS
Status: DISCONTINUED | OUTPATIENT
Start: 2018-07-20 | End: 2018-07-23 | Stop reason: HOSPADM

## 2018-07-20 RX ADMIN — MORPHINE SULFATE 2 MG: 2 INJECTION, SOLUTION INTRAMUSCULAR; INTRAVENOUS at 06:55

## 2018-07-20 RX ADMIN — SIMVASTATIN 20 MG: 20 TABLET, FILM COATED ORAL at 21:19

## 2018-07-20 RX ADMIN — Medication 10 ML: at 21:19

## 2018-07-20 RX ADMIN — STANDARDIZED SENNA CONCENTRATE AND DOCUSATE SODIUM 1 TABLET: 8.6; 5 TABLET, FILM COATED ORAL at 21:20

## 2018-07-20 RX ADMIN — OXYCODONE HYDROCHLORIDE AND ACETAMINOPHEN 1 TABLET: 5; 325 TABLET ORAL at 16:45

## 2018-07-20 RX ADMIN — OXYCODONE HYDROCHLORIDE 10 MG: 10 TABLET ORAL at 21:20

## 2018-07-20 ASSESSMENT — PAIN SCALES - GENERAL
PAINLEVEL_OUTOF10: 8
PAINLEVEL_OUTOF10: 3
PAINLEVEL_OUTOF10: 8
PAINLEVEL_OUTOF10: 0
PAINLEVEL_OUTOF10: 0
PAINLEVEL_OUTOF10: 7
PAINLEVEL_OUTOF10: 4
PAINLEVEL_OUTOF10: 3

## 2018-07-20 ASSESSMENT — PAIN DESCRIPTION - DESCRIPTORS
DESCRIPTORS: DISCOMFORT
DESCRIPTORS: ACHING;CONSTANT;DISCOMFORT

## 2018-07-20 ASSESSMENT — PAIN DESCRIPTION - FREQUENCY
FREQUENCY: CONTINUOUS

## 2018-07-20 ASSESSMENT — PAIN DESCRIPTION - ONSET
ONSET: ON-GOING

## 2018-07-20 ASSESSMENT — PAIN DESCRIPTION - ORIENTATION
ORIENTATION: RIGHT

## 2018-07-20 ASSESSMENT — PAIN DESCRIPTION - PAIN TYPE
TYPE: ACUTE PAIN

## 2018-07-20 ASSESSMENT — PAIN DESCRIPTION - LOCATION
LOCATION: ARM

## 2018-07-20 ASSESSMENT — PAIN DESCRIPTION - PROGRESSION
CLINICAL_PROGRESSION: NOT CHANGED
CLINICAL_PROGRESSION: NOT CHANGED

## 2018-07-20 NOTE — PROGRESS NOTES
Occupational Therapy          OT consult received and appreciated. Chart reviewed. Will hold evaluation due to LLE xray's ordered and ortho recommendations regarding weight bearing to RUE and LLE  . Will evaluate at a later time. Thank you. Lucy Salazar.  [address], [address]

## 2018-07-20 NOTE — CONSULTS
[facility] mg  4 mg Intravenous Q6H PRN Layla Dill Malmer, DO        sodium chloride flush 0.9 % injection 10 mL  10 mL Intravenous 2 times per day Layla Dill Malmer, DO   10 mL at 07/19/18 2103    sodium chloride flush 0.9 % injection 10 mL  10 mL Intravenous PRN Layla Dill Malmer, DO        0.9 % sodium chloride infusion   Intravenous Continuous Layla Dill Malmer,  mL/hr at 07/19/18 2103      acetaminophen (TYLENOL) tablet 650 mg  650 mg Oral Q4H PRN Layla Dill Malmer, DO        morphine (PF) injection 2 mg  2 mg Intravenous Q4H PRN Layla Dill Malmer, DO   2 mg at 07/20/18 9951    oxyCODONE-acetaminophen (PERCOCET) 5-325 MG per tablet 1 tablet  1 tablet Oral Q4H PRN Layla Dill Malmer, DO        simvastatin (ZOCOR) tablet 20 mg  20 mg Oral Nightly Layla Dill Malmer, DO   20 mg at 07/19/18 2103       Allergies   Allergen Reactions    Pcn [Penicillins] Rash         Social History     Social History    Marital status:       Spouse name: N/A    Number of children: N/A    Years of education: N/A     Occupational History     None     Social History Main Topics    Smoking status: Never Smoker    Smokeless tobacco: Never Used    Alcohol use No      Comment: coffee 2 cups daily    Drug use: No    Sexual activity: Not on file     Other Topics Concern    Not on file     Social History Narrative    No narrative on file         Review of Systems - History obtained from chart review  General ROS: positive for  - fatigue  Psychological ROS: positive for - memory difficulties  Ophthalmic ROS: negative  ENT ROS: negative  Allergy and Immunology ROS: negative  Hematological and Lymphatic ROS: negative  Endocrine ROS: negative  Breast ROS: negative for breast lumps  Respiratory ROS: positive for - shortness of breath  Cardiovascular ROS: no chest pain or dyspnea on exertion  Gastrointestinal ROS: no abdominal pain, change in bowel habits, or black or bloody stools  Genito-Urinary ROS: no dysuria, trouble voiding, or hematuria  Musculoskeletal ROS: positive for - joint pain, joint stiffness and muscular weakness  Neurological ROS: positive for - headaches  Dermatological ROS: negative      Physical Exam   Constitutional: She is well-developed, well-nourished, and in no distress. HENT:   Head: Normocephalic. Eyes: Pupils are equal, round, and reactive to light. Neck: Normal range of motion. Cardiovascular: Normal rate. Pulmonary/Chest: Effort normal.   Abdominal: Soft. Musculoskeletal: Normal range of motion. She exhibits edema. Lymphadenopathy:     She has cervical adenopathy. Neurological: She is alert. She displays normal reflexes. No cranial nerve deficit. She exhibits normal muscle tone. Coordination normal.   Skin: Skin is warm and dry. Psychiatric: Mood, memory, affect and judgment normal.         Imaging reviewed:        CT 7/20/18: There is a large skull base mass along the clivus and cavernous sinus   sinuses, which completely envelops the left internal carotid artery   below the bifurcation.       There is complete collapse of the C7 vertebrae without neural   compromise, but with resultant ventral flexion of the cervical spine   at that level.       There is sclerosis and irregularity of the medial first rib on the   right, perhaps pathologic fracture healing.       There is no evidence of acute traumatic hemorrhage or cervical spinal   change.            Radiation Safety and Treatment Support:  -previous Radiation history: Yes, multiple sites, [facility]  -history of connective tissue disease: No  -history of autoimmune disease: No  -pregnant: no  -nutrition consult prior to [address]: Yes  -PEG: No  -Dental evaluation prior to treatment:Yes  -Social Work requested: Yes  -Oncology Nurse navigator requested: Yes  -pre + post treatment PT / Rehab / PM+R evaluation considered: Yes  -ICD: No   -ICD brand: -  -ACS patient navigator: Howard Alberto [[phone]]  -Nurse Practitioners for Radiation Oncology:    ---Celina So, MSN, RN, FNP-C   ---Malena Quiroz, MSN, RN, FNP-BC        Assessment and Plan: Mrs. Meghan Vazquez is a pleasant and cooperative 80year old Serbian speaking female with a recent diagnosis of AJCC stage group IV thyroid cancer (probable) with a skull base and C spine lesion among other diffuse mets. We recommend short course palliative fractionated external beam radiation therapy to the concerning skull bass mass and C spine, with consideration for systemic TX vs Hospice care. This treatment is urgent and even though it is bone skull which I will site a response rate typical to 9714, the whole brain will be treated due to direct abutment to parenchyma. The risks, benefits, alternatives, process and logistics of external beam radiation were reviewed. We answered all of the patient's questions to the best of our ability. The pt (and her family) all verbalized understanding and seemed satisfied. Radiation planning will commence within 1 hour; the next step in management being the simulation scan, with external beam radiation to commence in a timely fashion thereafter which will be later today and I will come in and treat her again SAT AM then restart MON, IP or OP pending primary team discharge plans. It was a pleasure meeting Kinsey Gonzalez and her family today and we appreciate the referral and opportunity to be involved in her care. We had an extensive discussion today regarding the course to date (including a focused review of the applicable radiographic and laboratory information), multidisciplinary approach to cancer care, and indications for external beam radiation therapy as a component therein. A literature review and multidisciplinary discussion was performed after seeing this patient due to the complexity of the medical decision making in this case.  I personally spent greater than 75 minutes with this patient and performed the complete history and physical as above at today's visit, at least 45 minutes was in direct discussion and  regarding disease management. Rose Albert. Phyllis Dorantes MD [place] [address] Oncology  Cell: [phone]    [facility]:  [facility] [address]: [phone]  [address] [address]:  [phone]   FAX:    [phone]  [address] [place] [address] Road:  [phone]   FAX:  [phone]        NOTE: This report was transcribed using voice recognition software. Every effort was made to ensure accuracy; however, inadvertent computerized transcription errors may be present.

## 2018-07-20 NOTE — PLAN OF CARE
Problem: Risk for Impaired Skin Integrity  Goal: Tissue integrity - skin and mucous membranes  Structural intactness and normal physiological function of skin and  mucous membranes.    Outcome: Met This Shift      Problem: Falls - Risk of:  Goal: Will remain free from falls  Will remain free from falls   Outcome: Met This Shift      Problem: Pain:  Goal: Pain level will decrease  Pain level will decrease   Outcome: Met This Shift

## 2018-07-20 NOTE — CONSULTS
Palliative Medicine   Consult Note  Hayley Donahue PA-C        Admit Date: 7/19/2018  Hospital Day: 2    Referring Provider:  Johan Moore DO    Reason for Consult:    [x] Advance Care Planning  [x] Establish Goals of Care  [] Psychosocial Support  [] Symptom Management    History Obtained From:  patient, family member granddaughter and son, electronic medical record    Chief complaint: broken arm    HPI:  Luis Dhillon is a 80 y.o. female with significant past medical history of metastatic poorly differentiated thyroid cancer who presented to the ED with acute onset of right upper arm pain while walking down the stairs holding a railing. She was found to have a pathologic fracture of the right humerus. She was seen by orthopedic surgery and they have planned potential surgery next week. During the workup they additionally found multiple bone metastasis as well as cranial metastasis. Patient was also seen by radiation oncology and has started palliative radiation therapy. The patient had been seen previously it [facility]. She saw both oncology and palliative medicine there. Palliative medicine and call oncology notes were reviewed. Her last treatment was lenvatinib in November of last year. Her granddaughter provides most of the history as the patient is [place]-speaking. She returned to the [facility] the last several months. Hospice was consult and for discussion with the family as well. However the patient was off the floor much of the day and both hospice and oncology were unable to see the patient. She is currently lying in bed in her granddaughter is there for translation. Initially her son helps. The patient lives with her daughter and granddaughter. She was taking pain medicine in the past and had oxycodone liquid 20 mg per mL which she took 10 mg of fairly infrequently per granddaughter. She is receiving Percocet currently and that is not alleviating her pain well.  She received oxyCODONE-acetaminophen    IV Medications:   sodium chloride 100 mL/hr at 07/19/18 2103       I&O  No intake/output data recorded. Intake/Output Summary (Last 24 hours) at 07/20/18 1813  Last data filed at 07/20/18 0830   Gross per 24 hour   Intake             1023 ml   Output                0 ml   Net             1023 ml       Allergies:  Pcn [penicillins]    Social History:    Social History     Social History    Marital status:       Spouse name: N/A    Number of children: N/A    Years of education: N/A     Occupational History     None     Social History Main Topics    Smoking status: Never Smoker    Smokeless tobacco: Never Used    Alcohol use No      Comment: coffee 2 cups daily    Drug use: No    Sexual activity: Not on file     Other Topics Concern    Not on file     Social History Narrative    No narrative on file     Mindy Larson [phone]    Place of Residence : with family:  daughter    Family History:   Family History   Problem Relation Age of Onset    Cancer Mother         vaginal    Cancer Father         prostate         PHYSICAL EXAM:   Vitals:    BP (!) 128/55   Pulse 94   Temp 98.2 °F (36.8 °C) (Temporal)   Resp 16   Ht 5' 2\" (1.575 m)   Wt 121 lb (54.9 kg)   SpO2 96%   BMI 22.13 kg/m²   Gen: WD, WN, NAD, awake, alert   HEENT: normocephalic, atraumatic, PERRL, EOMI, sclera anicteric, conjunctiva pink and moist  Neck: adenopathy  Lungs: Bilaterally clear to auscultation, no wheezing rhonchi, rales   Heart: regular rate and rhythm, distant heart tones, no murmurs/rubs/gallops appreciated   Abdomen: normoactive bowel sounds, soft, non-tender, non-distended, no hepatospenomegaly   Extremities: splint right upper arm  Skin: warm, dry   Neuro: awake, alert, oriented x 3, follows commands, no gross neurologic deficits      DATA:    Labs:  CBC:  Lab Results   Component Value Date    WBC 4.3 07/19/2018    RBC 4.23 07/19/2018    HGB 12.4 07/19/2018    HCT 38.2 2018     2018    MCV 90.3 2018     BMP:  Lab Results   Component Value Date     2018    K 4.3 2018     2018    CO2 25 2018    BUN 14 2018    CREATININE 0.5 2018    GLUCOSE 150 2018    CALCIUM 9.9 2018     PT/INR:    Lab Results   Component Value Date    PROTIME 14.5 2018    INR 1.3 2018       Urine:  UA:  Lab Results   Component Value Date    COLORU Yellow 2017    PHUR 7.0 2017    CLARITYU Clear 2017    SPECGRAV <=1.005 2017    LEUKOCYTESUR Negative 2017    UROBILINOGEN 0.2 2017    BILIRUBINUR Negative 2017    BLOODU Negative 2017    GLUCOSEU Negative 2017       Imaging:  Xr Humerus Right (min 2 Views)    Result Date: 2018  Patient MRN:  [id number] : 1934 Age: 80 years Gender: Female Order Date:  2018 10:30 PM EXAM: XR HUMERUS RIGHT (MIN 2 VIEWS) COMPARISON: 2016 INDICATION:  portable post splinting portable post splinting VIEWS: 2 FINDINGS: There is satisfactory alignment status post internal fixation of right humerus. Mid shaft fracture lines are more apparent on current exam.     Status post casting/splint material placement with similar position of mid shaft right humerus fracture. Xr Femur Left (min 2 Views)    Result Date: 2018  Patient MRN:  [id number] : 1934 Age: 80 years Gender: Female Order Date:  2018 10:30 PM EXAM: XR HIP 2-3 VW W PELVIS LEFT, XR FEMUR LEFT (MIN 2 VIEWS) NUMBER OF IMAGES:  5, VIEWS:  5 INDICATION: Portable, Pain, History of Metastases COMPARISON: CT scans of the abdomen and pelvis dated 10/7/2016. Findings: The bones are osteopenic, limiting evaluation. At least two osteolytic lucencies are seen within the proximal to mid femoral diaphysis. These measure up to 2.4 x 0.5 cm in size. No acute fracture or dislocation is identified. The joint spaces appear preserved.  Excreted contrast media noted radiation will help alleviate pain as well. Constipation   -Patient denies one sure she is on a bowel regimen starting with senna-S 1 tablet twice daily    Metastatic poorly differentiated thyroid cancer   -Patient hasn't been on a treatment medication and return to home to [facility] [place] over the last several months. Patient is found to have new metastatic disease and currently there is a consult for oncology to see her. She is receiving palliative radiation at this point. Await oncology input on treatment options to better discuss goals of care with the patient and the family. Palliative encounter  Currently the primary concern of the family was the patient's pain. She has started the radiation to help alleviate the pain as well. We did not begin a conversation in regards to goals of care because oncology has not yet seen her. I would like to have their input as we started discussing her goals of care. Review of palliative  medicine notes from [facility] [employer] [facility] clinic indicate the patient did have a living will and healthcare power of . We will request these from the patient's daughter. We will continue to follow and provide support. Advanced directives - noted patient has is completed do not have a copy  Code status-code  Psychosocial support-provided to family and attempted to provide to patient as well with family translating for me. An additional need is a   available for the patient visits         Miranda Sandoval PA-C  Palliative Medicine    Time in minutes:  50 min. More than 50% of this interaction was related to counseling and information given r/t disease process; plan of care; and code status. Thank you for allowing Palliative Medicine to participate in the care of Racquel Meng.

## 2018-07-20 NOTE — PROGRESS NOTES
Spoke with ortho resident about eliquis per Dr. Narendra Castillo.  Electronically signed by Fang Greene RN on 7/19/2018 at 9:06 PM

## 2018-07-20 NOTE — PROGRESS NOTES
Physical Therapy    Date: 2018       Patient Name: Francia Nowak  : 1934      MRN: [id number]    PT consult received and appreciated. Chart reviewed. Will hold evaluation due to LLE xray's ordered and ortho recommendations regarding weight bearing to RUE and LLE  . Will evaluate at a later time. Thank you.      Logan Mcclelland, PT

## 2018-07-20 NOTE — PROGRESS NOTES
tablet 1 tablet  1 tablet Oral Q4H PRN Rick Bennett DO        simvastatin (ZOCOR) tablet 20 mg  20 mg Oral Nightly Rick Bennett DO   20 mg at 18 [id number]       Past Medical History:   Diagnosis Date    Blood clot in vein     Cancer ([facility]     thyroid    Diabetes mellitus type 2, diet-controlled (HCC)     GERD (gastroesophageal reflux disease)     Hyperlipidemia     Hypertension     Rectal bleed     Thyroid cancer ([facility] 2016    Wears dentures     upper       Past Surgical History:   Procedure Laterality Date    APPENDECTOMY      BLADDER SUSPENSION       SECTION      CHOLECYSTECTOMY      CHOLECYSTECTOMY  2003    COLONOSCOPY  11/10/2014    Justine-Darius - sigmoid diverticulosis     FL SONO GUIDE NEEDLE BIOPSY Right     Thyroid    UPPER GASTROINTESTINAL ENDOSCOPY  11/10/2014    Justine-Darius - Hiatal hernia        Family History   Problem Relation Age of Onset    Cancer Mother         vaginal    Cancer Father         prostate       Social History     Social History    Marital status:      Spouse name: N/A    Number of children: N/A    Years of education: N/A     Occupational History     None     Social History Main Topics    Smoking status: Never Smoker    Smokeless tobacco: Never Used    Alcohol use No      Comment: coffee 2 cups daily    Drug use: No    Sexual activity: Not on file     Other Topics Concern    Not on file     Social History Narrative    No narrative on file       Occupation: na  Retired:  na  If Retired, from [address] [place] [address] St: <<For Level 5, 10 or more systems>>  Patient seen today as an emergent inpatient consult from Dr CASTILLO [facility] service.  Patient, daughter, and granddaughter are here for consult related to the possibility of radiation therapy to the brain -approximately 20 minutes was spent utilizing booklet and handouts regarding radiation treatment to this area-Family expressed great concern regarding whether treatment

## 2018-07-20 NOTE — CONSULTS
[address] [place] [address]     Patient: Vel Zhu   : 1934  MRN: [id number]    Date of Consultation: 2018  Date of Service: 2018    Reason for Consultation: Brain and Spinal Mets     History of Present Illness: This is an 80year old female who presented as a transfer from [facility] for right arm fx and diffuse brain and spine mets. Patient is a poor historian to communication language barrier. Daughter present, states she has been c/o diffuse pain and difficulty ambulating. Mild headaches. No recent falls or trauma. Hx of CA, was being followed by Radiation Oncology in [place]. NSG consulted for mets to the brain and spine. Hx of anticoagulation. Allergies:   Pcn [penicillins]    Past Medical History:      Diagnosis Date    Blood clot in vein     Cancer ([address]     thyroid    Diabetes mellitus type 2, diet-controlled ([address]     GERD (gastroesophageal reflux disease)     Hyperlipidemia     Hypertension     Rectal bleed     Thyroid cancer ([address] 2016    Wears dentures     upper       Surgical History:      Procedure Laterality Date    APPENDECTOMY      BLADDER SUSPENSION       SECTION      CHOLECYSTECTOMY      CHOLECYSTECTOMY  2003    COLONOSCOPY  11/10/2014    So - sigmoid diverticulosis     [place] SONO GUIDE NEEDLE BIOPSY Right     Thyroid    UPPER GASTROINTESTINAL ENDOSCOPY  11/10/2014    So - Hiatal hernia        Social History:   reports that she has never smoked. She has never used smokeless tobacco.   reports that she does not drink alcohol.     Family History:  Family History   Problem Relation Age of Onset    Cancer Mother         vaginal    Cancer Father         prostate       Review of Systems:   Denies fever, chills, or night sweats  Denies headache, dizziness, syncope  Denies blurred vision, double vision  Denies chest pain, palpitations, SOB  Denies diarrhea, constipation, n/v  Denies dysuria, hematuria  Denies

## 2018-07-21 PROCEDURE — 1200000000 HC SEMI PRIVATE

## 2018-07-21 PROCEDURE — 77412 RADIATION TX DELIVERY LVL 3: CPT | Performed by: RADIOLOGY

## 2018-07-21 PROCEDURE — 6370000000 HC RX 637 (ALT 250 FOR IP): Performed by: PHYSICIAN ASSISTANT

## 2018-07-21 PROCEDURE — 97166 OT EVAL MOD COMPLEX 45 MIN: CPT

## 2018-07-21 PROCEDURE — 2580000003 HC RX 258: Performed by: INTERNAL MEDICINE

## 2018-07-21 PROCEDURE — 97530 THERAPEUTIC ACTIVITIES: CPT

## 2018-07-21 PROCEDURE — 6370000000 HC RX 637 (ALT 250 FOR IP): Performed by: INTERNAL MEDICINE

## 2018-07-21 PROCEDURE — 97165 OT EVAL LOW COMPLEX 30 MIN: CPT

## 2018-07-21 PROCEDURE — G8978 MOBILITY CURRENT STATUS: HCPCS

## 2018-07-21 PROCEDURE — 97162 PT EVAL MOD COMPLEX 30 MIN: CPT

## 2018-07-21 PROCEDURE — G8979 MOBILITY GOAL STATUS: HCPCS

## 2018-07-21 PROCEDURE — G8987 SELF CARE CURRENT STATUS: HCPCS

## 2018-07-21 PROCEDURE — G8988 SELF CARE GOAL STATUS: HCPCS

## 2018-07-21 RX ADMIN — SIMVASTATIN 20 MG: 20 TABLET, FILM COATED ORAL at 21:22

## 2018-07-21 RX ADMIN — STANDARDIZED SENNA CONCENTRATE AND DOCUSATE SODIUM 1 TABLET: 8.6; 5 TABLET, FILM COATED ORAL at 21:22

## 2018-07-21 RX ADMIN — OXYCODONE HYDROCHLORIDE 10 MG: 10 TABLET ORAL at 08:37

## 2018-07-21 RX ADMIN — OXYCODONE HYDROCHLORIDE 10 MG: 10 TABLET ORAL at 21:22

## 2018-07-21 RX ADMIN — SODIUM CHLORIDE: 9 INJECTION, SOLUTION INTRAVENOUS at 16:57

## 2018-07-21 RX ADMIN — STANDARDIZED SENNA CONCENTRATE AND DOCUSATE SODIUM 1 TABLET: 8.6; 5 TABLET, FILM COATED ORAL at 08:38

## 2018-07-21 RX ADMIN — SODIUM CHLORIDE: 9 INJECTION, SOLUTION INTRAVENOUS at 03:32

## 2018-07-21 RX ADMIN — OXYCODONE HYDROCHLORIDE 10 MG: 10 TABLET ORAL at 14:27

## 2018-07-21 ASSESSMENT — PAIN DESCRIPTION - PAIN TYPE: TYPE: ACUTE PAIN

## 2018-07-21 ASSESSMENT — PAIN DESCRIPTION - PROGRESSION: CLINICAL_PROGRESSION: NOT CHANGED

## 2018-07-21 ASSESSMENT — PAIN DESCRIPTION - LOCATION: LOCATION: ARM;GENERALIZED

## 2018-07-21 ASSESSMENT — PAIN SCALES - GENERAL
PAINLEVEL_OUTOF10: 4
PAINLEVEL_OUTOF10: 8
PAINLEVEL_OUTOF10: 0
PAINLEVEL_OUTOF10: 8
PAINLEVEL_OUTOF10: 7

## 2018-07-21 ASSESSMENT — PAIN DESCRIPTION - FREQUENCY: FREQUENCY: CONTINUOUS

## 2018-07-21 ASSESSMENT — PAIN DESCRIPTION - ONSET: ONSET: ON-GOING

## 2018-07-21 ASSESSMENT — PAIN DESCRIPTION - ORIENTATION: ORIENTATION: RIGHT

## 2018-07-21 ASSESSMENT — PAIN DESCRIPTION - DESCRIPTORS: DESCRIPTORS: ACHING;DISCOMFORT

## 2018-07-21 NOTE — PROGRESS NOTES
Physical Therapy    Facility/Department: [facility]  Initial Assessment    NAME: Nishant Paul  : 1934  MRN: [id number]    Date of Service: 2018  Evaluating Therapist: Madison Gutierrez PT    DME Recommendations: 18\" WC with removable armrests and standard leg rests, WC cushion, slide board    Room #: 5020-V  DIAGNOSIS: R humeral head fracture  PRECAUTIONS: Fall risk, cervical spine precautions, NWB RUE and LLE, C7 fracture, widespread metastasis, Citizen of the [place] speaking with  iPad    Social:  Pt lives with her daughter and daughter's family in a 2 floor plan (1st floor 1/2 bath and recliner the pt is sleeping in) with 3 steps and no rail to enter. The daughter reports she is trying to have a ramp installed and will be putting a bed on the 1st floor for the pt. Prior to admission pt walked with no AD. Initial Evaluation  Date: 18 Treatment  Date: Short Term/ Long Term   Goals   Was pt agreeable to Eval/treatment? Yes     Does pt have pain? Significant RUE pain, denied LLE pain, general malaise     Bed Mobility  Rolling: Mod A  Supine to sit: Mod A  Sit to supine: Mod A  Scooting: Mod A  SBA   Transfers Sit to stand: Min A (not maintaining LLE NWB)  Stand to sit: Min A  Stand pivot: Min A (not maintaining LLE NWB)  Slide board: NT  SBA  Slide board: SBA   Ambulation   NT     Stair negotiation: ascended and descended NT     AM-PAC Raw Score 8/24       BLE ROM is WFL. BLE strength is grossly WFL. Seated Balance: Supervision at EOB  Standing Balance: Min A dynamically (not maintaining LLE NWB)  Sensation: NT  Edema: None noted  Endurance: Fair  Skin was inspected: BLEs appear intact    ASSESSMENT  Pt displays functional ability as noted in the objective portion of this evaluation. Comments/Treatment:  The pt was seen sitting up in a bedside chair and social history was obtained from the daughter who was present. The pt's NWB orders were explained to the pt and her family.  The PT

## 2018-07-21 NOTE — PROGRESS NOTES
OCCUPATIONAL THERAPY INITIAL EVALUATION      Date:2018  Patient Name: Leila Zaragoza  MRN: [id number]  : 1934  Room: [address] [place], [place] [address]     Evaluating OT: Joann Giles OTR/L #[id number]      Recommended Adaptive Equipment: TBD, Drop-arm BSC    AM PAC ADL RAW SCORE -  13 24, G Code CL     Diagnosis:    Past Medical History: Thyroid CA extensive METS to bone, DM, HTN, Rectal bleed    Precautions: falls, NWB RUE with coaptation splint , NWB LLE, widespread METS to bone, C7 Fracture, cervical spine precautions, Upper sor[place] Speaking (family translates)     METS to base of skull, thoracic and sacral spine, R iliac bone, L femur     Home Living: Pt lives with daughter in a 2 floor plan home, 1st floor setup available with 3 stairs to enter and 0 rails; bed/bath on seconf, looking to have bedroom on first floor, 1/2 on first with standard commode  Bathroom setup: Shower/tub and standard commode on second, will likely need to sponge bath  Equipment owned: none     Prior Level of Function: IND with ADLs shared with IADLs; using no AD for ambulation. Driving: no  Occupation: retired    Pain Level: pt c/o RUE pain,diffuse body pain this session      Cognition: oriented x 3; follows 2-3  step directions. fair Problem solving skills  fair Memory   fair Sequencing  Additional Comments: Pt alert and cooperative with therapy this date.   Pt demonstrates difficulty maintaining LLE NWB with family translating precautions    Sensory:   Hearing: WFL  Vision: WFL    Glasses: yes [] no [x] reading []      UE Assessment:  Hand Dominance: Right []  Left []     Strength ROM Additional Info:    RUE  N/t NWB precautions Immobilized  fair  and fair FMC/dexterity noted during ADL tasks     LUE 4/5 WFL good  and good FMC/dexterity noted during ADL tasks   Sensation: Pt denies n/t this date  [facility]  Edema:None noted    Functional Assessment:   Initial Eval Status 17 Comments   Feeding  SBA    Grooming  Mod A    Upper Body Dressing Max A     Lower Body Dressing Max A    Bathing Max A    Toileting  Max A    Bed Mobility  Supine to Sit: n/t  Sit to Supine: Mod A  Rolling: n/t    Functional Transfers Sit to Stand: Min A  Stand to sit: Min A  Stand Pivot Transfer: Min A  Unable to maintain NWB LLE    Functional Mobility n/t      Sit balance: IND  Stand balance: min A  Endurance/Activity tolerance: poor                              Comments: Upon arrival pt seated in chair. At end of session pt supine in bed with all devices within reach, all lines and tubes intact. Treatment: Pt seated in chair upon therapist arrival and family able to provide PLOF and translate education regarding NWB RUE and LLE precautions. Pt states wanting to sit up in chair and was seen later that date and assisted to SPT to transport cart and then from transport cart to bed with assistance of therapist. Pt demonstrates being unable to maintain NWB LLE, with attempted assistance to lift left leg, throughout SPT and was re educated on precaution. Pt was assisted to return to supine in bed with all devices in reach. Pt will require w/c and sliding board education to maintain precautions.      Assessment of current deficits   Functional mobility [x]  ADLs [x] Strength []  Cognition []  Functional transfers  [x] IADLs [x] Safety Awareness [x]  Endurance [x]  Fine Motor Coordination [] Balance [x] Vision/perception [] Sensation []   Gross Motor Coordination [] ROM []       Eval Complexity: Mod      Treatment frequency: 2-4 x week      Plan of Care:  ADL retraining [x]   Equipment needs [x]   Neuromuscular re-education []  Energy Conservation Techniques [x]  Functional Transfer training [x]  Patient and/or Family Education [x]  Functional Mobility training [x]  Environmental Modifications [x]  Cognitive re-training []    Compensatory techniques for ADLs [x]  Splinting Needs []   Positioning to improve overall function [x]  Other: []      Rehab Potential: Good    Patient / Family

## 2018-07-21 NOTE — CONSULTS
Patrizia Haines      Patient Name: Nishant Paul  YOB: 1934  PCP: Tia Valencia DO   Referring Provider: Leslee Brasher / [facility] [id number]     Reason for Consultation: No chief complaint on file. History of Present Illness: This pt is a very pleasant 81 yo female with a pmhx significant for stage IV (T4b N1 M1) poorly differentiated thyroid carcinoma with diffuse skeletal metastasis and innumerable BL pulmonary nodules, previous seen at [facility] under the care of Dr. Eliza Spencer and previously on Lenvatinib 20 mg daily. She had been on this medication until she went to the [place] over the winter. She had been taking it there but developed PNA and took a long time to recover (per family). She has not been on the medication in several months (exact duration unknown). She presented to the ER here with complaints of R arm pain and was found to have a R humerus fracture. Additional imaging showed a very concerning skull base lesions at the clivus with encasement of the internal carotid and a large C7 lesion with associated vertebral destruction. Additionally, the L spine showed diffuse involvement as did the liver. Oncology was consulted to provide additional evaluation and treatment recommendations.     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 ([address] 07/20/2018    Pain, neoplasm-related     Palliative care by specialist     HTN (hypertension) 07/19/2018    Hyperlipidemia 07/19/2018    Humerus fracture 07/19/2018    Chronic anticoagulation 179 07/19/2018    LYMPHOPCT 10.4 (L) 07/19/2018    RBC 4.23 07/19/2018    MCH 29.3 07/19/2018    MCHC 32.5 07/19/2018    RDW 12.2 07/19/2018    NEUTOPHILPCT 80.9 (H) 07/19/2018    MONOPCT 7.6 07/19/2018    BASOPCT 0.0 07/19/2018    NEUTROABS 3.51 07/19/2018    LYMPHSABS 0.45 (L) 07/19/2018    MONOSABS 0.33 07/19/2018    EOSABS 0.04 (L) 07/19/2018    BASOSABS 0.00 07/19/2018       Lab Results   Component Value Date     07/19/2018    K 4.3 07/19/2018     07/19/2018    CO2 25 07/19/2018    BUN 14 07/19/2018    CREATININE 0.5 07/19/2018    GLUCOSE 150 (H) 07/19/2018    CALCIUM 9.9 07/19/2018    PROT 6.8 07/19/2018    LABALBU 4.0 07/19/2018    BILITOT 0.6 07/19/2018    ALKPHOS 66 07/19/2018    AST 21 07/19/2018    ALT 8 07/19/2018    LABGLOM >60 07/19/2018    GFRAA >60 07/19/2018       No results found for: IRON, TIBC, FERRITIN        Radiology-    XR Bone Survey Complete   Final Result   1. Lytic lesions in the left femur and the skull compatible with   metastatic disease or myeloma. 2.  Probable congestive heart failure         XR HIP 2-3 VW W PELVIS LEFT   Final Result   Osteolytic lucencies within the proximal to mid femoral   diaphysis suspicious for metastatic disease (versus multiple myeloma). XR FEMUR LEFT (MIN 2 VIEWS)   Final Result   Osteolytic lucencies within the proximal to mid femoral   diaphysis suspicious for metastatic disease (versus multiple myeloma). XR HUMERUS RIGHT (MIN 2 VIEWS)   Final Result      Status post casting/splint material placement with similar position of   mid shaft right humerus fracture. ASSESSMENT/PLAN :  81 yo female  Previously seen by Dr. Araceli Palacios at [facility]  Stage IV (T4b N1 M1) poorly differentiated thyroid carcinoma with wide spread mets to the bones and innumerable BL pulmonary nodules.  Previously on Lenvatinib 20mg/daily  Admitted with R arm humerus fracture and concerning skull base and C7 lesions    - Dr. Logan Almeida from radiation

## 2018-07-21 NOTE — PLAN OF CARE
Problem: Risk for Impaired Skin Integrity  Goal: Tissue integrity - skin and mucous membranes  Structural intactness and normal physiological function of skin and  mucous membranes.    Outcome: Met This Shift      Problem: Falls - Risk of:  Goal: Absence of physical injury  Absence of physical injury   Outcome: Met This Shift      Problem: Pain:  Goal: Control of acute pain  Control of acute pain   Outcome: Met This Shift

## 2018-07-21 NOTE — PROGRESS NOTES
Justine-Darius - sigmoid diverticulosis     AL SONO GUIDE NEEDLE BIOPSY Right     Thyroid    UPPER GASTROINTESTINAL ENDOSCOPY  11/10/2014    Justine-Darius - Hiatal hernia        Allergies:  Pcn [penicillins]    Family Goal: Patient and family would ultimately get back to the [place] held with Daughter Jennifer Olmedo and patient along with granddaughter  Medical record reviewed. 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 She was on oral chemo agent but when in the [facility] medication difficult to get and was also experiencing nausea with medication. Has been following up at [facility] but has missed a couple of appointments. Was experiencing arm pain and found a fractured humerus. Family's goal for patient is to get her stable enough to travel back to the [facility] where the majority of her family resides. Family was interested in hearing about Hospice Care just in case they are not able to get her strong enough to travel. Explained Hospice becomes involved when a patient is given a life limiting illness and as disease follows normal course has life expectancy of six months. With Hospice involvement no further aggressive treatment is sought. Hospice focuses on comfort measures only. Did explain the care givers involved: nursing and home health aides visiting 1-3 times a week,  services, SW and non medical volunteers. Did explain levels of Hospice Care: routine care, GIP symptom management which would be done at the [place] [id number] (patient would stay there until symptoms are controlled and then return home-could be a little as 24 hours), Transition Program which is private pay, $300.00/daily, up to eight weeks, and Respite Care. Patient also is full code and family does not want to change at this time.  Jennifer Olmedo appreciated information I gave her but right now would like to try and get her a bit more stable to get her back to the [facility] [place]. Macario Lala states if her mother is not able to travel, she will contact [facility]. Brochure given to family. Did notify bedside nurse, Lidya Randle of above. Hospice of the [address] [place] [address]:  1. Patient is not presently under the care of [facility]. 2. Please contact [phone] with any questions.     Discharge Plan:  Discharge Disposition; Home  Facility Name:     Electronically signed by Ce Joya RN on 7/21/2018 at 10:20 AM

## 2018-07-22 PROCEDURE — 1200000000 HC SEMI PRIVATE

## 2018-07-22 PROCEDURE — 6370000000 HC RX 637 (ALT 250 FOR IP): Performed by: PHYSICIAN ASSISTANT

## 2018-07-22 PROCEDURE — 2580000003 HC RX 258: Performed by: INTERNAL MEDICINE

## 2018-07-22 PROCEDURE — 6370000000 HC RX 637 (ALT 250 FOR IP): Performed by: INTERNAL MEDICINE

## 2018-07-22 PROCEDURE — 6360000002 HC RX W HCPCS: Performed by: PHYSICIAN ASSISTANT

## 2018-07-22 RX ADMIN — STANDARDIZED SENNA CONCENTRATE AND DOCUSATE SODIUM 1 TABLET: 8.6; 5 TABLET, FILM COATED ORAL at 09:58

## 2018-07-22 RX ADMIN — SODIUM CHLORIDE: 9 INJECTION, SOLUTION INTRAVENOUS at 13:47

## 2018-07-22 RX ADMIN — OXYCODONE HYDROCHLORIDE 10 MG: 10 TABLET ORAL at 03:07

## 2018-07-22 RX ADMIN — MORPHINE SULFATE 4 MG: 4 INJECTION INTRAVENOUS at 16:49

## 2018-07-22 RX ADMIN — OXYCODONE HYDROCHLORIDE 10 MG: 10 TABLET ORAL at 13:46

## 2018-07-22 RX ADMIN — SODIUM CHLORIDE: 9 INJECTION, SOLUTION INTRAVENOUS at 03:07

## 2018-07-22 RX ADMIN — OXYCODONE HYDROCHLORIDE 10 MG: 10 TABLET ORAL at 09:58

## 2018-07-22 RX ADMIN — SIMVASTATIN 20 MG: 20 TABLET, FILM COATED ORAL at 22:05

## 2018-07-22 RX ADMIN — Medication 10 ML: at 16:49

## 2018-07-22 RX ADMIN — OXYCODONE HYDROCHLORIDE 10 MG: 10 TABLET ORAL at 23:41

## 2018-07-22 RX ADMIN — STANDARDIZED SENNA CONCENTRATE AND DOCUSATE SODIUM 1 TABLET: 8.6; 5 TABLET, FILM COATED ORAL at 22:05

## 2018-07-22 ASSESSMENT — PAIN DESCRIPTION - ORIENTATION
ORIENTATION: RIGHT
ORIENTATION: RIGHT

## 2018-07-22 ASSESSMENT — PAIN DESCRIPTION - PAIN TYPE
TYPE: ACUTE PAIN
TYPE: ACUTE PAIN

## 2018-07-22 ASSESSMENT — PAIN DESCRIPTION - LOCATION
LOCATION: ARM
LOCATION: ARM;SHOULDER

## 2018-07-22 ASSESSMENT — PAIN SCALES - GENERAL
PAINLEVEL_OUTOF10: 5
PAINLEVEL_OUTOF10: 0
PAINLEVEL_OUTOF10: 0
PAINLEVEL_OUTOF10: 5
PAINLEVEL_OUTOF10: 8
PAINLEVEL_OUTOF10: 0
PAINLEVEL_OUTOF10: 9
PAINLEVEL_OUTOF10: 8

## 2018-07-22 ASSESSMENT — PAIN DESCRIPTION - FREQUENCY: FREQUENCY: CONTINUOUS

## 2018-07-22 ASSESSMENT — PAIN DESCRIPTION - DESCRIPTORS
DESCRIPTORS: DISCOMFORT;SORE;CONSTANT
DESCRIPTORS: SORE;DISCOMFORT

## 2018-07-22 ASSESSMENT — PAIN DESCRIPTION - ONSET: ONSET: ON-GOING

## 2018-07-22 ASSESSMENT — PAIN DESCRIPTION - PROGRESSION: CLINICAL_PROGRESSION: NOT CHANGED

## 2018-07-22 NOTE — PLAN OF CARE
Problem: Risk for Impaired Skin Integrity  Goal: Tissue integrity - skin and mucous membranes  Structural intactness and normal physiological function of skin and  mucous membranes. Outcome: Met This Shift      Problem: Falls - Risk of:  Goal: Will remain free from falls  Will remain free from falls   Outcome: Met This Shift    Goal: Absence of physical injury  Absence of physical injury   Outcome: Met This Shift      Problem: Pain:  Goal: Pain level will decrease  Pain level will decrease   Outcome: Met This Shift      Comments: Denies any further needs at this time. Many family members at bedside. Instructed to utilize call light with any needs or concerns. Will continue to monitor.     Electronically signed by Amna Rogers RN on 7/22/2018 at 5:46 PM

## 2018-07-22 NOTE — PROGRESS NOTES
Internal Medicine   Progress Note    Admit Date: 2018  Hospital day:    Hospital Day: 4  SUBJECTIVE:  No new acute problems overnight. Doing OK. No chest pain or shortness of breath. No nausea or vomiting. No fever. No abdominal pain. OBJECTIVE:     BP (!) 117/49   Pulse 95   Temp 98.7 °F (37.1 °C) (Temporal)   Resp 16   Ht 5' 2\" (1.575 m)   Wt 121 lb (54.9 kg)   SpO2 96%   BMI 22.13 kg/m²   Patient Vitals for the past 24 hrs:   BP Temp Temp src Pulse Resp SpO2   18 0015 (!) 117/49 98.7 °F (37.1 °C) Temporal 95 16 -   18 - - - - - 96 %   18 1644 124/74 98.2 °F (36.8 °C) - 74 15 93 %     24HR INTAKE/OUTPUT:    Intake/Output Summary (Last 24 hours) at 18 0934  Last data filed at 18 1274   Gross per 24 hour   Intake             3620 ml   Output                0 ml   Net             3620 ml      GENERAL: Awake, breathing easily, not in apparent acute distress. LUNGS:  No obvious increased work of breathing, clear to auscultation bilaterally. CARDIOVASCULAR:  S1 and S2 regular to auscultate. SM+  ABDOMEN:  Soft, non-tender. Bowel sounds present    Data      Recent Labs      18   1253   WBC  4.3*   HGB  12.4   PLT  179     Recent Labs      18   1253   NA  139   K  4.3   CL  101   CO2  25   BUN  14   CREATININE  0.5   GLUCOSE  150*     Recent Labs      18   1253   AST  21   ALT  8   BILITOT  0.6   ALKPHOS  66     Xr Humerus Right (min 2 Views)    Result Date: 2018  Patient MRN:  [id number] : 1934 Age: 80 years Gender: Female Order Date:  2018 10:30 PM EXAM: XR HUMERUS RIGHT (MIN 2 VIEWS) COMPARISON: 2016 INDICATION:  portable post splinting portable post splinting VIEWS: 2 FINDINGS: There is satisfactory alignment status post internal fixation of right humerus.  Mid shaft fracture lines are more apparent on current exam.     Status post casting/splint material placement with similar position of mid shaft right humerus osteopenic, limiting evaluation. At least two osteolytic lucencies are seen within the proximal to mid femoral diaphysis. These measure up to 2.4 x 0.5 cm in size. No acute fracture or dislocation is identified. The joint spaces appear preserved. Excreted contrast media noted within the urinary bladder. Osteolytic lucencies within the proximal to mid femoral diaphysis suspicious for metastatic disease (versus multiple myeloma).        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

## 2018-07-23 ENCOUNTER — TELEPHONE (OUTPATIENT)
Dept: FAMILY MEDICINE CLINIC | Age: 83
End: 2018-07-23

## 2018-07-23 VITALS
WEIGHT: 121 LBS | BODY MASS INDEX: 22.26 KG/M2 | DIASTOLIC BLOOD PRESSURE: 58 MMHG | TEMPERATURE: 97.5 F | HEART RATE: 92 BPM | HEIGHT: 62 IN | OXYGEN SATURATION: 94 % | RESPIRATION RATE: 16 BRPM | SYSTOLIC BLOOD PRESSURE: 134 MMHG

## 2018-07-23 PROCEDURE — 77412 RADIATION TX DELIVERY LVL 3: CPT

## 2018-07-23 PROCEDURE — 6370000000 HC RX 637 (ALT 250 FOR IP): Performed by: PHYSICIAN ASSISTANT

## 2018-07-23 PROCEDURE — 2580000003 HC RX 258: Performed by: INTERNAL MEDICINE

## 2018-07-23 PROCEDURE — 6360000002 HC RX W HCPCS: Performed by: INTERNAL MEDICINE

## 2018-07-23 RX ORDER — ONDANSETRON 4 MG/1
4 TABLET, ORALLY DISINTEGRATING ORAL EVERY 8 HOURS PRN
Qty: 30 TABLET | Refills: 0 | Status: SHIPPED | OUTPATIENT
Start: 2018-07-23

## 2018-07-23 RX ORDER — OXYCODONE HYDROCHLORIDE AND ACETAMINOPHEN 5; 325 MG/1; MG/1
1 TABLET ORAL EVERY 6 HOURS PRN
Qty: 28 TABLET | Refills: 0 | Status: SHIPPED | OUTPATIENT
Start: 2018-07-23 | End: 2018-08-20

## 2018-07-23 RX ORDER — SENNA AND DOCUSATE SODIUM 50; 8.6 MG/1; MG/1
1 TABLET, FILM COATED ORAL 2 TIMES DAILY
COMMUNITY
Start: 2018-07-23

## 2018-07-23 RX ADMIN — Medication 10 ML: at 13:20

## 2018-07-23 RX ADMIN — STANDARDIZED SENNA CONCENTRATE AND DOCUSATE SODIUM 1 TABLET: 8.6; 5 TABLET, FILM COATED ORAL at 09:19

## 2018-07-23 RX ADMIN — OXYCODONE HYDROCHLORIDE 10 MG: 10 TABLET ORAL at 13:10

## 2018-07-23 RX ADMIN — ONDANSETRON 4 MG: 2 INJECTION INTRAMUSCULAR; INTRAVENOUS at 13:20

## 2018-07-23 RX ADMIN — OXYCODONE HYDROCHLORIDE 10 MG: 10 TABLET ORAL at 09:19

## 2018-07-23 ASSESSMENT — PAIN DESCRIPTION - PAIN TYPE: TYPE: ACUTE PAIN

## 2018-07-23 ASSESSMENT — PAIN DESCRIPTION - ONSET: ONSET: ON-GOING

## 2018-07-23 ASSESSMENT — PAIN DESCRIPTION - ORIENTATION: ORIENTATION: RIGHT

## 2018-07-23 ASSESSMENT — PAIN SCALES - GENERAL
PAINLEVEL_OUTOF10: 8
PAINLEVEL_OUTOF10: 8
PAINLEVEL_OUTOF10: 5

## 2018-07-23 ASSESSMENT — PAIN DESCRIPTION - DESCRIPTORS: DESCRIPTORS: ACHING;DISCOMFORT;SORE

## 2018-07-23 ASSESSMENT — PAIN DESCRIPTION - LOCATION: LOCATION: ARM;SHOULDER

## 2018-07-23 ASSESSMENT — PAIN DESCRIPTION - FREQUENCY: FREQUENCY: CONTINUOUS

## 2018-07-23 ASSESSMENT — PAIN DESCRIPTION - PROGRESSION: CLINICAL_PROGRESSION: NOT CHANGED

## 2018-07-23 NOTE — PROGRESS NOTES
7.6 07/19/2018    BASOPCT 0.0 07/19/2018    MONOSABS 0.33 07/19/2018    LYMPHSABS 0.45 07/19/2018    EOSABS 0.04 07/19/2018    BASOSABS 0.00 07/19/2018     CMP:    Lab Results   Component Value Date     07/19/2018    K 4.3 07/19/2018     07/19/2018    CO2 25 07/19/2018    BUN 14 07/19/2018    CREATININE 0.5 07/19/2018    GFRAA >60 07/19/2018    LABGLOM >60 07/19/2018    GLUCOSE 150 07/19/2018    PROT 6.8 07/19/2018    LABALBU 4.0 07/19/2018    CALCIUM 9.9 07/19/2018    BILITOT 0.6 07/19/2018    ALKPHOS 66 07/19/2018    AST 21 07/19/2018    ALT 8 07/19/2018     Warfarin PT/INR:    Lab Results   Component Value Date    INR 1.3 07/19/2018    PROTIME 14.5 (H) 07/19/2018       Assessment:    Active Problems:    Thyroid cancer (HCC)    HTN (hypertension)    Hyperlipidemia    Humerus fracture    Chronic anticoagulation    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  *      Plan:  Discussed plan with pt and family member at bedside PA home on pain meds        Vivia Sep  7:31 AM  7/23/2018

## 2018-07-24 ENCOUNTER — TELEPHONE (OUTPATIENT)
Dept: RADIATION ONCOLOGY | Age: 83
End: 2018-07-24

## 2018-07-24 LAB
BLOOD CULTURE, ROUTINE: NORMAL
CULTURE, BLOOD 2: NORMAL

## 2018-07-24 NOTE — TELEPHONE ENCOUNTER
Moon Sanchez daughter of patient Luis Dhillon calls Rad-ONC department at [facility]. She states they wish to discontinued Radiation therapy. Patient had started Palliative Radiation therapy to Cranium and spine received 900 cGy in 3 fx. Informed daughter I would update Dr. Monique Pino of their decision. Daughter very thankful for the care her mother received. - Notified Radiation therapist Johanna Cheek of cancellation.  - Note routed to Dr. Monique Pino (working at other campus today).

## 2018-07-25 ENCOUNTER — TELEPHONE (OUTPATIENT)
Dept: ORTHOPEDIC SURGERY | Age: 83
End: 2018-07-25